# Patient Record
Sex: FEMALE | Race: WHITE
[De-identification: names, ages, dates, MRNs, and addresses within clinical notes are randomized per-mention and may not be internally consistent; named-entity substitution may affect disease eponyms.]

---

## 2022-12-19 ENCOUNTER — HOSPITAL ENCOUNTER (OUTPATIENT)
Dept: HOSPITAL 75 - RAD | Age: 72
End: 2022-12-19
Payer: MEDICARE

## 2022-12-19 DIAGNOSIS — M51.27: ICD-10-CM

## 2022-12-19 DIAGNOSIS — M51.26: ICD-10-CM

## 2022-12-19 DIAGNOSIS — M48.061: ICD-10-CM

## 2022-12-19 DIAGNOSIS — M48.07: ICD-10-CM

## 2022-12-19 DIAGNOSIS — M47.817: ICD-10-CM

## 2022-12-19 DIAGNOSIS — M47.816: Primary | ICD-10-CM

## 2022-12-19 PROCEDURE — 72148 MRI LUMBAR SPINE W/O DYE: CPT

## 2022-12-19 NOTE — DIAGNOSTIC IMAGING REPORT
PROCEDURE: 

MRI lumbar spine.



TECHNIQUE: 

Multiplanar, multisequence MRI of the lumbar spine was performed

without contrast.



INDICATION:  

Right leg pain with radiation to lower back.



COMPARISON: 

None.



FINDINGS: 

For the purposes of this exam, the last well-formed disc space is

denoted the L5-S1 level. Evaluation of static alignment shows

mild dextroscoliotic deformity epicentered at the L3 level. There

is also mild grade 1 anterolisthesis at L3-L4. There is no

evidence of jumped facets.



Vertebral body heights are maintained. There is no evidence of

acute fracture. Evaluation of the marrow signal demonstrates

fairly prominent Modic type I change involving the adjacent

endplates at L3-L4. There is also multilevel intervertebral disc

height loss, greatest at L2-L3 and L3-L4.



The visualized portions of the distal cord are unremarkable. The

conus terminates at approximately the L1-L2 level. No abnormal

intrathecal filling defects are seen. Pre and paravertebral soft

tissue structures are unremarkable.



Axial images could not be obtained as the patient terminated the

exam prematurely. Level by level degenerative changes are based

on the sagittal views.



L1-L2: 

There is no large disc bulge or focal protrusion. There is no

significant spinal canal or neuroforaminal stenosis.



L2-L3: 

There is a broad-based posterior disc bulge with bilateral

ligamentum flavum laxity and facet arthropathy. As a result,

there does appear to be at least moderate stenosis of the spinal

canal and bilateral neural foramen.



L3-L4:

There is unroofing of the disc with a broad-based posterior disc

bulge and asymmetric endplate osteophyte formations laterally to

the left. There is also bilateral ligamentum flavum laxity and

facet arthropathy. As a result, there is moderate stenosis of the

spinal canal and asymmetric moderate to severe stenosis of the

left neuroforamen with mild narrowing on the right.



L4-L5: 

There is a broad-based posterior disc bulge with bilateral

ligamentum flavum laxity and facet arthropathy. As a result,

there is mild narrowing of the spinal canal and bilateral neural

foramen.



L5-S1: 

There is a broad-based posterior disc bulge with bilateral facet

arthropathy. As a result, there is mild narrowing of the spinal

canal and bilateral neural foramen.



IMPRESSION:

1. Moderate multilevel degenerative changes of the lumbar spine,

greatest at the L2-L3 and L3-L4 levels as above.

2. No acute fracture or dislocation.



Dictated by: 



  Dictated on workstation # [id number]